# Patient Record
Sex: FEMALE | Race: WHITE
[De-identification: names, ages, dates, MRNs, and addresses within clinical notes are randomized per-mention and may not be internally consistent; named-entity substitution may affect disease eponyms.]

---

## 2018-01-04 ENCOUNTER — HOSPITAL ENCOUNTER (OUTPATIENT)
Dept: HOSPITAL 45 - C.LAB | Age: 38
Discharge: HOME | End: 2018-01-04
Attending: NURSE PRACTITIONER
Payer: COMMERCIAL

## 2018-01-04 DIAGNOSIS — R53.83: ICD-10-CM

## 2018-01-04 DIAGNOSIS — E03.9: Primary | ICD-10-CM

## 2018-01-04 LAB
ALBUMIN SERPL-MCNC: 3.9 GM/DL (ref 3.4–5)
ALP SERPL-CCNC: 62 U/L (ref 45–117)
ALT SERPL-CCNC: 45 U/L (ref 12–78)
AST SERPL-CCNC: 22 U/L (ref 15–37)
BASOPHILS # BLD: 0.02 K/UL (ref 0–0.2)
BASOPHILS NFR BLD: 0.2 %
BUN SERPL-MCNC: 17 MG/DL (ref 7–18)
CALCIUM SERPL-MCNC: 9.2 MG/DL (ref 8.5–10.1)
CO2 SERPL-SCNC: 27 MMOL/L (ref 21–32)
CREAT SERPL-MCNC: 0.84 MG/DL (ref 0.6–1.2)
EOS ABS #: 0.09 K/UL (ref 0–0.5)
EOSINOPHIL NFR BLD AUTO: 289 K/UL (ref 130–400)
GLUCOSE SERPL-MCNC: 89 MG/DL (ref 70–99)
HCT VFR BLD CALC: 36.6 % (ref 37–47)
HGB BLD-MCNC: 12.5 G/DL (ref 12–16)
IG#: 0.01 K/UL (ref 0–0.02)
IMM GRANULOCYTES NFR BLD AUTO: 24.8 %
LYMPHOCYTES # BLD: 2.28 K/UL (ref 1.2–3.4)
MCH RBC QN AUTO: 30.6 PG (ref 25–34)
MCHC RBC AUTO-ENTMCNC: 34.2 G/DL (ref 32–36)
MCV RBC AUTO: 89.5 FL (ref 80–100)
MONO ABS #: 0.62 K/UL (ref 0.11–0.59)
MONOCYTES NFR BLD: 6.7 %
NEUT ABS #: 6.18 K/UL (ref 1.4–6.5)
NEUTROPHILS # BLD AUTO: 1 %
NEUTROPHILS NFR BLD AUTO: 67.2 %
PMV BLD AUTO: 10.5 FL (ref 7.4–10.4)
POTASSIUM SERPL-SCNC: 3.9 MMOL/L (ref 3.5–5.1)
PROT SERPL-MCNC: 7.9 GM/DL (ref 6.4–8.2)
RED CELL DISTRIBUTION WIDTH CV: 12.6 % (ref 11.5–14.5)
RED CELL DISTRIBUTION WIDTH SD: 40.7 FL (ref 36.4–46.3)
SODIUM SERPL-SCNC: 138 MMOL/L (ref 136–145)
WBC # BLD AUTO: 9.2 K/UL (ref 4.8–10.8)

## 2018-02-22 ENCOUNTER — HOSPITAL ENCOUNTER (OUTPATIENT)
Dept: HOSPITAL 45 - C.ULTR | Age: 38
Discharge: HOME | End: 2018-02-22
Attending: NURSE PRACTITIONER
Payer: COMMERCIAL

## 2018-02-22 DIAGNOSIS — E04.1: Primary | ICD-10-CM

## 2018-02-22 NOTE — DIAGNOSTIC IMAGING REPORT
SOFT TISS HEAD/NECK-THYROID



HISTORY: Thyroid nodule  E04.1 Solitary thyroid [id number]



COMPARISON:  12/21/2016



FINDINGS:



Right lobe:  Maximum dimension 4.5 cm. Mild heterogeneous architecture. No

significant nodularity.



Left lobe:  Maximum dimension 5.0 cm. Heterogeneous internal architecture. 2.3

cm complex left thyroid nodule unchanged from the prior study.



Isthmus:  6 mm cyst versus hypoechoic nodule. No change from the prior exam.



IMPRESSION:  



1. Stable thyroid ultrasound compared to the prior study.





2. The thyroid nodule of the left lobe as well as the thyroid isthmus remain

unchanged.

3. No new or interval findings. 









The above report was generated using voice recognition software.  It may contain

grammatical, syntax or spelling errors.







Electronically signed by:  Jignesh Hanna M.D.

2/22/2018 8:33 AM



Dictated Date/Time:  2/22/2018 8:31 AM

## 2018-04-03 ENCOUNTER — HOSPITAL ENCOUNTER (OUTPATIENT)
Dept: HOSPITAL 45 - C.RDSM | Age: 38
Discharge: HOME | End: 2018-04-03
Attending: INTERNAL MEDICINE
Payer: COMMERCIAL

## 2018-04-03 DIAGNOSIS — M25.572: Primary | ICD-10-CM

## 2018-04-03 NOTE — DIAGNOSTIC IMAGING REPORT
LEFT ANKLE 3 VIEWS



HISTORY:      LEFT ANKLE PAIN



COMPARISON: None.



FINDINGS: There is no fracture or dislocation. Mild lateral soft tissue

swelling. No radiopaque foreign bodies.



IMPRESSION:  

No fractures.







Electronically signed by:  Geraldo Prado M.D.

4/3/2018 4:00 PM



Dictated Date/Time:  4/3/2018 3:59 PM